# Patient Record
Sex: FEMALE | Race: OTHER | HISPANIC OR LATINO
[De-identification: names, ages, dates, MRNs, and addresses within clinical notes are randomized per-mention and may not be internally consistent; named-entity substitution may affect disease eponyms.]

---

## 2022-09-28 PROBLEM — Z00.00 ENCOUNTER FOR PREVENTIVE HEALTH EXAMINATION: Status: ACTIVE | Noted: 2022-09-28

## 2022-10-11 ENCOUNTER — APPOINTMENT (OUTPATIENT)
Dept: NEUROSURGERY | Facility: CLINIC | Age: 32
End: 2022-10-11

## 2022-10-11 ENCOUNTER — TRANSCRIPTION ENCOUNTER (OUTPATIENT)
Age: 32
End: 2022-10-11

## 2022-10-11 ENCOUNTER — NON-APPOINTMENT (OUTPATIENT)
Age: 32
End: 2022-10-11

## 2022-10-11 VITALS
TEMPERATURE: 98.5 F | HEIGHT: 61 IN | BODY MASS INDEX: 32.1 KG/M2 | WEIGHT: 170 LBS | DIASTOLIC BLOOD PRESSURE: 73 MMHG | OXYGEN SATURATION: 97 % | SYSTOLIC BLOOD PRESSURE: 108 MMHG | HEART RATE: 80 BPM

## 2022-10-11 PROCEDURE — 99204 OFFICE O/P NEW MOD 45 MIN: CPT

## 2022-10-11 NOTE — HISTORY OF PRESENT ILLNESS
[FreeTextEntry1] : review recent images [de-identified] : 33yo left handed female s/p clipping of cerebral aneurysm 3 years ago at [facility] with h/o migranes.  A few weeks ago her headaches were different and followed up with her   Neurologist Dr Yovana Bruno who ordered CTA,MRI/MRA \par Denies any Family history aneurysm\par Non smoker\par \par Handedness \par Left\par \par Referring:\par \par none\par \par PCP:\par  Dr Julio Dodson\par NKDA\par \par PSH Clipping cerebral aneurysm\par \par Medications:\par \par Maxlate\par Botox injection [id number] \par Ajovi\par

## 2022-10-11 NOTE — PLAN
[FreeTextEntry1] : PLAN\par \par Reviewed images with patient and discussed\par Recommend Dx Angiogram  (11/4/2022)\par Risks and Benefits discussed\par Will call with the time and preop insructions\par Pt verbalized understanding \par Once angiogram is ocmpleted will discuss CVC and call the pt

## 2022-12-01 ENCOUNTER — OUTPATIENT (OUTPATIENT)
Dept: OUTPATIENT SERVICES | Facility: HOSPITAL | Age: 32
LOS: 1 days | Discharge: HOME | End: 2022-12-01
Payer: MEDICAID

## 2022-12-01 ENCOUNTER — APPOINTMENT (OUTPATIENT)
Dept: NEUROSURGERY | Facility: HOSPITAL | Age: 32
End: 2022-12-01

## 2022-12-01 ENCOUNTER — TRANSCRIPTION ENCOUNTER (OUTPATIENT)
Age: 32
End: 2022-12-01

## 2022-12-01 VITALS
OXYGEN SATURATION: 99 % | RESPIRATION RATE: 18 BRPM | DIASTOLIC BLOOD PRESSURE: 55 MMHG | WEIGHT: 117.07 LBS | HEART RATE: 83 BPM | SYSTOLIC BLOOD PRESSURE: 118 MMHG | TEMPERATURE: 98 F

## 2022-12-01 PROCEDURE — 36224 PLACE CATH CAROTD ART: CPT | Mod: 50

## 2022-12-01 PROCEDURE — 36226 PLACE CATH VERTEBRAL ART: CPT | Mod: LT

## 2022-12-01 RX ORDER — SODIUM CHLORIDE 9 MG/ML
1000 INJECTION, SOLUTION INTRAVENOUS
Refills: 0 | Status: DISCONTINUED | OUTPATIENT
Start: 2022-12-01 | End: 2022-12-15

## 2022-12-01 RX ORDER — SODIUM CHLORIDE 9 MG/ML
500 INJECTION INTRAMUSCULAR; INTRAVENOUS; SUBCUTANEOUS
Refills: 0 | Status: DISCONTINUED | OUTPATIENT
Start: 2022-12-01 | End: 2022-12-15

## 2022-12-01 RX ORDER — CHLORHEXIDINE GLUCONATE 213 G/1000ML
1 SOLUTION TOPICAL ONCE
Refills: 0 | Status: DISCONTINUED | OUTPATIENT
Start: 2022-12-01 | End: 2022-12-15

## 2022-12-01 NOTE — PRE-ANESTHESIA EVALUATION ADULT - NSANTHOSAYNRD_GEN_A_CORE
No. AMY screening performed.  STOP BANG Legend: 0-2 = LOW Risk; 3-4 = INTERMEDIATE Risk; 5-8 = HIGH Risk No

## 2022-12-01 NOTE — BRIEF OPERATIVE NOTE - OPERATION/FINDINGS
Under MAC, using a 4 fr sheath right CFA, cerebral angiogram was performed.    Findings: There are micro clips in the right posterior communicating artery region.  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  Full report to follow.  Patient tolerated procedure well, no new neurological deficit, hemodynamically stable.  Right groin/vasc access site: Direct manual compression applied, hemostasis achieved, no hematoma.  Patient was transferred to Cath lab recovery area and will go home later today.  Above d/w: Dr. Pratt

## 2022-12-01 NOTE — PROGRESS NOTE ADULT - SUBJECTIVE AND OBJECTIVE BOX
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 Reportedly, she has an untreated aneurysm in the region of the clipped aneurysm. We reviewed the MRA and CTA of the head from September 2022. There is metal artifact from the clip. There is an apparent fusiform dilatation of the paraclinoid right ICA.       T(F):  T(F): --  HR: --  BP: --  BP(mean): --  RR: --  SpO2: --      PMHx: Cerebral Aneurysms, migraines, hypercholesterolemia    PSHx: Craniotomy for clip ligation of brain aneurysm (2020)           c/S x2, (2009/2017) Hysterectomy (2019) appendectomy (2010) lumbar spine surgery (2-16)    Meds:       PHYSICAL EXAM:    General: Comfortable in no acute distress  Neck supple, No JVD, FROM, Nontender  Alert , oriented to time, place and person  Pupils 2.5 mm b/l RTL, EOM intact  CN II - XII intact  Motor: 5/5 in all four limbs, No pronator drift  Sensory: B/l Symmetrical and intact to fine touch  Cerebellar Signs: No dysmetria, Finger nose test negative  DTR: 2+ in all four limbs  Distal Pulsations: 2+ bilateral DP  Skin: Intact, No bruises/ petechiae/ Rashes/Lesions noted      DIET:  [x] NPO  [] Mechanical  [] Tube feeds    LABS:                       MEDICATIONS:  Antibiotics:    Neuro:    Anticoagulation:           HPI:   32 years-old left-handed woman with history of migraine headaches and 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 Reportedly, she has an untreated aneurysm in the region of the clipped aneurysm. MRA and CTA of the head from September 2022 were reviewed. There is metal artifact from the clip. There is an apparent fusiform dilatation of the paraclinoid right ICA.       T(F): 98  HR: 85  BP: 118/55  RR: 18  SpO2: 100%      PMHx: Cerebral Aneurysms, migraines, hypercholesterolemia    PSHx: Craniotomy for clip ligation of brain aneurysm (2020)           c/S x2, (2009/2017) Hysterectomy (2019) appendectomy (2010) lumbar spine surgery (2-16)    Meds: None      PHYSICAL EXAM:    General: Comfortable in no acute distress  Neck supple, No JVD, FROM, Nontender  Alert , oriented to time, place and person  Pupils 2.5 mm b/l RTL, EOM intact  CN II - XII intact  Motor: 5/5 in all four limbs, No pronator drift  Sensory: B/l Symmetrical and intact to fine touch  Cerebellar Signs: No dysmetria, Finger nose test negative  DTR: 2+ in all four limbs  Distal Pulsations: 2+ bilateral DP  Skin: Intact, No bruises/ petechiae/ Rashes/Lesions noted      DIET:  [x] NPO  [] Mechanical  [] Tube feeds    LABs reviewed    MEDICATIONS: None    Proc, R/B/A were discussed, all questions were answered and consent was signed.

## 2022-12-01 NOTE — PRE-ANESTHESIA EVALUATION ADULT - SPO2 (%)
Patient Instructions by Charlie Underwood MD at 03/13/18 10:09 AM     Author:  Charlie Underwood MD Service:  (none) Author Type:  Physician     Filed:  03/13/18 10:09 AM Encounter Date:  3/13/2018 Status:  Signed     :  Charlie Underwood MD (Physician)            PLAN:    Follow up for surgery.     Additional Educational Resources:  For additional resources regarding your symptoms, diagnosis, or further health information, please visit the Health Resources section on [email or web address] or the Online Health Resources section in [facility].        Revision History        User Key Date/Time User Provider Type Action    > [N/A] 03/13/18 10:09 AM Charlie Underwood MD Physician Sign            
[phone]

## 2022-12-02 PROCEDURE — C1769: CPT

## 2022-12-02 PROCEDURE — C1887: CPT

## 2022-12-02 PROCEDURE — 36224 PLACE CATH CAROTD ART: CPT | Mod: 50

## 2022-12-02 PROCEDURE — C1894: CPT

## 2022-12-02 PROCEDURE — 36226 PLACE CATH VERTEBRAL ART: CPT | Mod: LT

## 2022-12-12 DIAGNOSIS — I67.1 CEREBRAL ANEURYSM, NONRUPTURED: ICD-10-CM

## 2022-12-12 DIAGNOSIS — G43.909 MIGRAINE, UNSPECIFIED, NOT INTRACTABLE, WITHOUT STATUS MIGRAINOSUS: ICD-10-CM

## 2022-12-12 DIAGNOSIS — E78.00 PURE HYPERCHOLESTEROLEMIA, UNSPECIFIED: ICD-10-CM

## 2022-12-14 NOTE — REASON FOR VISIT
BRUISING/PAIN/TENDERNESS [Follow-Up: _____] : a [unfilled] follow-up visit [New Patient Visit] : a new patient visit [Referred By: _________] : Patient was referred by RAINE [Home] : at home, [unfilled] , at the time of the visit. [Medical Office: ([facility])___] : at the medical office located in  [Patient] : the patient

## 2022-12-20 ENCOUNTER — APPOINTMENT (OUTPATIENT)
Dept: NEUROSURGERY | Facility: CLINIC | Age: 32
End: 2022-12-20

## 2022-12-20 DIAGNOSIS — I67.1 CEREBRAL ANEURYSM, NONRUPTURED: ICD-10-CM

## 2022-12-20 PROCEDURE — 99213 OFFICE O/P EST LOW 20 MIN: CPT | Mod: 95

## 2022-12-20 NOTE — HISTORY OF PRESENT ILLNESS
[FreeTextEntry1] : review recent images [de-identified] : 31yo left handed female s/p clipping of cerebral aneurysm 3 years ago at [facility] with h/o migrans.  A few weeks ago her headaches were different and followed up with her   Neurologist Dr Yovana Bruno who ordered CTA,MRI/MRA \par Denies any Family history aneurysm\par Non smoker\par \par \par Pt underwent on 12/1/2022 DX Cerebral angiogram revealing Right, no residual of clipped right ICA supraclinoid aneurysm, 1mm aneurysm dilatation of superior hypophyseal right ICA, 1mm aneurysm dilation of superior hypophyseal left ICA ICA cavernous right ICA\par \par Handedness \par Left\par \par Referring:\par \par none\par \par PCP:\par  Dr Julio Dodson\par NKDA\par \par PSH Clipping cerebral aneurysm\par \par Medications:\par \par Maxlate\par Botox injection [id number] \par Ajovi

## 2022-12-20 NOTE — PHYSICAL EXAM
[General Appearance - Alert] : alert [General Appearance - In No Acute Distress] : in no acute distress [Oriented To Time, Place, And Person] : oriented to person, place, and time [Impaired Insight] : insight and judgment were intact [Affect] : the affect was normal [Person] : oriented to person [Place] : oriented to place [Time] : oriented to time [Short Term Intact] : short term memory intact [Remote Intact] : remote memory intact [Span Intact] : the attention span was normal [Concentration Intact] : normal concentrating ability [Fluency] : fluency intact [Comprehension] : comprehension intact [Current Events] : adequate knowledge of current events [Past History] : adequate knowledge of personal past history [Vocabulary] : adequate range of vocabulary [Cranial Nerves Optic (II)] : visual acuity intact bilaterally,  pupils equal round and reactive to light [Cranial Nerves Oculomotor (III)] : extraocular motion intact [Cranial Nerves Trigeminal (V)] : facial sensation intact symmetrically [Cranial Nerves Facial (VII)] : face symmetrical [Cranial Nerves Vestibulocochlear (VIII)] : hearing was intact bilaterally [Cranial Nerves Glossopharyngeal (IX)] : tongue and palate midline [Cranial Nerves Accessory (XI - Cranial And Spinal)] : head turning and shoulder shrug symmetric [Cranial Nerves Hypoglossal (XII)] : there was no tongue deviation with protrusion [Motor Strength] : muscle strength was normal in all four extremities [No Muscle Atrophy] : normal bulk in all four extremities [Sensation Tactile Decrease] : light touch was intact [Balance] : balance was intact [2+] : Patella left 2+ [No Visual Abnormalities] : no visible abnormailities [No Tenderness to Palpation] : no spine tenderness on palpation [Full ROM] : full ROM [No Pain with ROM] : no pain with motion in any direction [Normal] : normal [Intact] : all reflexes within normal limits bilaterally [Sclera] : the sclera and conjunctiva were normal [PERRL With Normal Accommodation] : pupils were equal in size, round, reactive to light, with normal accommodation [Extraocular Movements] : extraocular movements were intact [Outer Ear] : the ears and nose were normal in appearance [Oropharynx] : the oropharynx was normal [Neck Appearance] : the appearance of the neck was normal [Neck Cervical Mass (___cm)] : no neck mass was observed [Jugular Venous Distention Increased] : there was no jugular-venous distention [Thyroid Diffuse Enlargement] : the thyroid was not enlarged [Thyroid Nodule] : there were no palpable thyroid nodules [Auscultation Breath Sounds / Voice Sounds] : lungs were clear to auscultation bilaterally [Heart Rate And Rhythm] : heart rate was normal and rhythm regular [Heart Sounds] : normal S1 and S2 [Heart Sounds Gallop] : no gallops [Murmurs] : no murmurs [Heart Sounds Pericardial Friction Rub] : no pericardial rub [Full Pulse] : the pedal pulses are present [Edema] : there was no peripheral edema [Abdomen Soft] : soft [Bowel Sounds] : normal bowel sounds [Abdomen Tenderness] : non-tender [Abdomen Mass (___ Cm)] : no abdominal mass palpated [No CVA Tenderness] : no ~M costovertebral angle tenderness [No Spinal Tenderness] : no spinal tenderness [Abnormal Walk] : normal gait [Nail Clubbing] : no clubbing  or cyanosis of the fingernails [Musculoskeletal - Swelling] : no joint swelling seen [Motor Tone] : muscle strength and tone were normal [Skin Color & Pigmentation] : normal skin color and pigmentation [] : no rash [Skin Turgor] : normal skin turgor [Past-pointing] : there was no past-pointing [Tremor] : no tremor present [L'Hermitte's] : neck flexion did not produce tingling down the spine/arms [Spurling's - Opposite Side] : Negative Spurling's on opposite side [Spurling's Same Side] : Negative Spurling's on same side [Straight-Leg Raise Test - Left] : straight leg raise of the left leg was negative [Straight-Leg Raise Test - Right] : straight leg raise  of the right leg was negative

## 2022-12-20 NOTE — ASSESSMENT
[FreeTextEntry1] : PLAN\par \par Reviewed cerebral angiogram with pt\par Discussed signs and symptoms of rupture aneurysm  \par NWill follow up MRA Sole in 2024\par Can cyndie the office is she develops any further deficits\par Pt verbalized understandidng

## 2023-03-15 NOTE — PRE-ANESTHESIA EVALUATION ADULT - RESPIRATORY RATE (BREATHS/MIN)
Please inform patient of normal H  pylori stool test   We will follow-up as scheduled    Thank you
18